# Patient Record
Sex: MALE | Race: WHITE | URBAN - METROPOLITAN AREA
[De-identification: names, ages, dates, MRNs, and addresses within clinical notes are randomized per-mention and may not be internally consistent; named-entity substitution may affect disease eponyms.]

---

## 2022-09-16 ENCOUNTER — OFFICE VISIT (OUTPATIENT)
Dept: FAMILY MEDICINE CLINIC | Age: 27
End: 2022-09-16
Payer: COMMERCIAL

## 2022-09-16 VITALS
RESPIRATION RATE: 16 BRPM | SYSTOLIC BLOOD PRESSURE: 118 MMHG | WEIGHT: 242.9 LBS | BODY MASS INDEX: 31.17 KG/M2 | DIASTOLIC BLOOD PRESSURE: 82 MMHG | TEMPERATURE: 97.9 F | OXYGEN SATURATION: 100 % | HEIGHT: 74 IN | HEART RATE: 69 BPM

## 2022-09-16 DIAGNOSIS — Z13.228 SCREENING FOR METABOLIC DISORDER: ICD-10-CM

## 2022-09-16 DIAGNOSIS — Z71.82 EXERCISE COUNSELING: ICD-10-CM

## 2022-09-16 DIAGNOSIS — Z71.85 VACCINE COUNSELING: ICD-10-CM

## 2022-09-16 DIAGNOSIS — K21.9 GASTROESOPHAGEAL REFLUX DISEASE, UNSPECIFIED WHETHER ESOPHAGITIS PRESENT: Primary | ICD-10-CM

## 2022-09-16 DIAGNOSIS — Z76.89 ESTABLISHING CARE WITH NEW DOCTOR, ENCOUNTER FOR: ICD-10-CM

## 2022-09-16 DIAGNOSIS — Z71.3 DIETARY COUNSELING: ICD-10-CM

## 2022-09-16 PROBLEM — H52.03 HYPEROPIA OF BOTH EYES: Status: ACTIVE | Noted: 2021-11-09

## 2022-09-16 PROCEDURE — 99203 OFFICE O/P NEW LOW 30 MIN: CPT | Performed by: SURGERY

## 2022-09-16 RX ORDER — PANTOPRAZOLE SODIUM 40 MG/1
40 TABLET, DELAYED RELEASE ORAL
Qty: 60 TABLET | Refills: 0 | Status: SHIPPED | OUTPATIENT
Start: 2022-09-16

## 2022-09-16 SDOH — ECONOMIC STABILITY: FOOD INSECURITY: WITHIN THE PAST 12 MONTHS, YOU WORRIED THAT YOUR FOOD WOULD RUN OUT BEFORE YOU GOT MONEY TO BUY MORE.: NEVER TRUE

## 2022-09-16 SDOH — ECONOMIC STABILITY: FOOD INSECURITY: WITHIN THE PAST 12 MONTHS, THE FOOD YOU BOUGHT JUST DIDN'T LAST AND YOU DIDN'T HAVE MONEY TO GET MORE.: NEVER TRUE

## 2022-09-16 ASSESSMENT — SOCIAL DETERMINANTS OF HEALTH (SDOH): HOW HARD IS IT FOR YOU TO PAY FOR THE VERY BASICS LIKE FOOD, HOUSING, MEDICAL CARE, AND HEATING?: NOT HARD AT ALL

## 2022-09-16 ASSESSMENT — LIFESTYLE VARIABLES
HOW OFTEN DO YOU HAVE A DRINK CONTAINING ALCOHOL: 2-4 TIMES A MONTH
HOW MANY STANDARD DRINKS CONTAINING ALCOHOL DO YOU HAVE ON A TYPICAL DAY: 10 OR MORE

## 2022-09-16 ASSESSMENT — PATIENT HEALTH QUESTIONNAIRE - PHQ9
SUM OF ALL RESPONSES TO PHQ9 QUESTIONS 1 & 2: 0
2. FEELING DOWN, DEPRESSED OR HOPELESS: 0
SUM OF ALL RESPONSES TO PHQ QUESTIONS 1-9: 0
1. LITTLE INTEREST OR PLEASURE IN DOING THINGS: 0

## 2022-09-16 NOTE — PATIENT INSTRUCTIONS
-Carbohydrates limit to 40 to 50g per meal (120g to 150g per day)  -Fats limit to 60g per day (total fat intake should be 30% to 35% of your total daily calories, so restrict to whichever number is lower)   -Of the fats you do eat, never eat trans fats, never eat unsaturated fats, limit your saturated fat intake to less than 20g per day (or 7% of your total daily calories, so restrict to whichever number is lower)  -Cholesterol limit to no more than 300mg per day (200mg per day if you have elevated triglycerides or total cholesterol)  -Sodium less than 2000mg per day    [employer] or similar application (or track by journal) to monitor calories and macronutrients. This is the most critical component to a heart healthy, balanced diet: honesty, keeping oneself accountable, ensure you are tracking daily goals and meeting them. Food is medicine! Counseled the patient on importance of cardiovascular and resistance training. Make every attempt to engage in a level of activity, sustained for at least 30 minutes, where you saturate your undergarments with sweat. This should be done, at a minimum, three times per week.      ___________________________________________________        Raise the head of your bed by 3.5\" (1/2 height cinder block from [employer]), no food or drink within 3 hours of laying flat, track diet/know triggers/avoid trigger.

## 2022-09-16 NOTE — PROGRESS NOTES
Michaela Herrera (:  1995) is a 32 y.o. male,New patient, here for evaluation of the following chief complaint(s):  Establish Care (Has not seen PCP in 3 years), Abdominal Pain (Pain behind rib cage), and Health Maintenance (Due for Covid Vaccine, HIV Screen, Hep C screen)         ASSESSMENT/PLAN:  1. Gastroesophageal reflux disease, unspecified whether esophagitis present  -     CBC with Auto Differential; Future  -     Comprehensive Metabolic Panel; Future  -     pantoprazole (PROTONIX) 40 MG tablet; Take 1 tablet by mouth every morning (before breakfast), Disp-60 tablet, R-0Normal  - PPI for 60 days if breakthrough during or after cessation then GI for EGD  - No alarm s/s  - Lifestyle modifications   2. Screening for metabolic disorder  -     CBC with Auto Differential; Future  -     Comprehensive Metabolic Panel; Future  3. Dietary counseling  Counseled the patient on diet, continue to make positive choices. It is important to focus on meeting food group needs with nutrient dense foods and stay within caloric limits. Nutrient dense foods will provide you with vitamins, minerals, and other health promoting nutrients. You should avoid added sugars, saturated fats, hydrogenated oils, and limit sodium intake (this isn't just table salt. .. turn the package over, read the label, stay under 2000 mg or 2g of total sodium daily). Track your calories, this will help you get an understanding of what a proper portion size is. Every day you should eat these:  -Veggies of all types  -Fruits  -Grains (strive for at least half of these to be whole-grain)  -Lean protein (poultry, fish, legumes, nuts)    Stick to the plate diet.    -85% of your dinner plate should be leafy green vegetables, dark green, red and orange vegetables. Do not use high-calorie dressing is a ranch or dressings that are filled with added sugars. 25% of your dinner plate should be whole grains.   The remaining 25% of your dinner plate should be a lean protein. Limit your red meat intake to once per week and no more than 4 ounces in any serving.  -No need for second helpings. Limit or avoid these foods:  -Added sugars (less than 10% of your total calories in any given day should be from sugars)  -Saturated fats and hydrogenated oils (less than 10% of your total calories in any given day should be from these)  -Sodium (less than 2000 mg/day)  -Alcoholic beverages (even in moderation, alcohol can cause long-term health issues involving hypertension, electrolyte abnormalities, liver disease and even cancers of the mouth and throat)    Stay hydrated. Unless instructed otherwise by your physician, you should strive to drink at least eight 8 ounce glasses of water daily, some of these being fortified with electrolytes (such as a Pedialyte, or low calorie sports drink). Again, avoid added sugars. 4. Exercise counseling  Counseled the patient on importance of cardiovascular and resistance training. Make every attempt to engage in a level of activity, sustained for at least 30 minutes, where you saturate your undergarments with sweat. This should be done, at a minimum, three times per week. 5. Vaccine counseling  Risks and benefits  6. Establishing care with new doctor, encounter for  All personal, family, social, surgical, medical history is reviewed and updated with patient. Allergies, medications updated. List of specialists follows with updated. DM/HM updated. Return in about 2 months (around 11/15/2022) for gerd .          Subjective   SUBJECTIVE/OBJECTIVE:  HPI:    Loss to follow up   Served in [employer] ([employer])       Bilateral Rib Cage pain:  -first noticed about 6 weeks prior  -shipping  as career (forklift, no heavy lifting)  -came out of nowhere  -pain described as an ache, 2 to 4/10  -was intermittent at first then become continuous for about a week  -made worse postprandially  -sensation of bubble in epigastrium along with burning sensation from sternum up to posterior oropharynx    -no hemoptysis, hematemesis, hematochezia, melena      Psych:  PHQ-9 Total Score: 0 (9/16/2022  8:35 AM)      Preventative:  Health Maintenance   Topic Date Due    Depression Screen  Never done    Flu vaccine (1) 09/02/2024 (Originally 9/1/2022)    COVID-19 Vaccine (1) 09/01/2028 (Originally 1995)    DTaP/Tdap/Td vaccine (3 - Td or Tdap) 09/13/2031    Varicella vaccine  Completed    Hepatitis A vaccine  Aged Out    Hepatitis B vaccine  Aged Out    Hib vaccine  Aged Out    Meningococcal (ACWY) vaccine  Aged Out    Pneumococcal 0-64 years Vaccine  Aged Out    Hepatitis C screen  Discontinued    HIV screen  Discontinued     Psych:  PHQ-9 Total Score: 0 (9/16/2022  8:35 AM)      ROS:    Denies 10pt ROS other than noted in HPI. Objective       PHYSICAL EXAM:    /82   Pulse 69   Temp 97.9 °F (36.6 °C) (Temporal)   Resp 16   Ht 6' 2\" (1.88 m)   Wt 242 lb 14.4 oz (110.2 kg)   SpO2 100%   BMI 31.19 kg/m²     AVSS    GA: Well-groomed, appears well, no acute distress. HEENT: Atraumatic normocephalic. Extraocular muscles are grossly intact. Pupils are equal round reactive to light. Conjunctiva pink and moist.  Hearing is grossly intact. Nares patent without external drainage. Buccal mucosa pink and moist.  Posterior oropharynx clear without lesion or exudate. NECK: Trachea is midline, supple, nontender, no lymphadenopathy. CARDIO: Regular rate and rhythm without murmur rub or gallop. Cap refill 2+. Radial pulses 2+ bilaterally. RESPIRATORY: Clear to auscultation bilaterally without wheezes rales or rhonchi. Normal inspiratory and expiratory effort. Normoxic on room air    ABD: Rounded, normoactive bowel sounds. Soft, nontender, no organomegaly. MSK: Structurally appropriate for age. No gross deficit. NEURO: Alert, no gross deficit. PSYCH:  Mood is normal and congruent with affect.  No signs of psychomotor retardation or agitation. Thought content seems normal, speech is fluent and non-pressured. SKIN: Generally warm pink and dry. An electronic signature was used to authenticate this note.     --Lisa Villeda, DO

## 2022-12-02 ENCOUNTER — OFFICE VISIT (OUTPATIENT)
Dept: FAMILY MEDICINE CLINIC | Age: 27
End: 2022-12-02

## 2022-12-02 VITALS
SYSTOLIC BLOOD PRESSURE: 120 MMHG | RESPIRATION RATE: 16 BRPM | TEMPERATURE: 97.5 F | DIASTOLIC BLOOD PRESSURE: 74 MMHG | HEIGHT: 74 IN | HEART RATE: 57 BPM | OXYGEN SATURATION: 99 % | WEIGHT: 232.8 LBS | BODY MASS INDEX: 29.88 KG/M2

## 2022-12-02 DIAGNOSIS — K21.9 GASTROESOPHAGEAL REFLUX DISEASE, UNSPECIFIED WHETHER ESOPHAGITIS PRESENT: ICD-10-CM

## 2022-12-02 DIAGNOSIS — Z71.3 DIETARY COUNSELING: Primary | ICD-10-CM

## 2022-12-02 RX ORDER — PANTOPRAZOLE SODIUM 40 MG/1
40 TABLET, DELAYED RELEASE ORAL
Qty: 90 TABLET | Refills: 3 | Status: CANCELLED | OUTPATIENT
Start: 2022-12-02

## 2022-12-02 NOTE — PATIENT INSTRUCTIONS
Eat between 1400 and 1800 Calories per day to loose weight  -Carbohydrates limit to 40 to 50g per meal (120g to 150g per day)  -Fats limit to 60g per day (total fat intake should be 30% to 35% of your total daily calories, so restrict to whichever number is lower)   -Of the fats you do eat, never eat trans fats, never eat unsaturated fats, limit your saturated fat intake to less than 20g per day (or 7% of your total daily calories, so restrict to whichever number is lower)  -Cholesterol limit to no more than 300mg per day (200mg per day if you have elevated triglycerides or total cholesterol)  -Sodium less than 2000mg per day    [employer] or similar application (or track by journal) to monitor calories and macronutrients. This is the most critical component to a heart healthy, balanced diet: honesty, keeping oneself accountable, ensure you are tracking daily goals and meeting them. Food is medicine! Counseled the patient on importance of cardiovascular and resistance training. Make every attempt to engage in a level of activity, sustained for at least 30 minutes, where you saturate your undergarments with sweat. This should be done, at a minimum, three times per week. Target HR 155bpm sustained for 15 min straight 5 times per week.          GERD  -No food within 3 hours of laying to bed (same goes for medications with the exception of sleep aids that are 1 hour prior to bed)  -No drink within 1 hour of laying to bed  -Elevate the head of the bed by 30 degrees (1/2 heigh cinder blocks work well)   -Keep food diary and know/avoid triggers

## 2022-12-02 NOTE — PROGRESS NOTES
Blanche Knott (:  1995) is a 32 y.o. male,Established patient, here for evaluation of the following chief complaint(s):  Follow-up, Gastroesophageal Reflux, and Health Maintenance         ASSESSMENT/PLAN:  1. Dietary counseling  Excellent job with wt loss  Reinforced dietary concepts noted in avs  Improved  No change  2. Gastroesophageal reflux disease, unspecified whether esophagitis present  Resolved  Reinforced lifestyle changes      Return in about 10 months (around 2023) for annual adult exam .         Subjective   SUBJECTIVE/OBJECTIVE:  HPI:      GERD:  cut out dietary triggers  restricting within 3 hours of bed  Did not identify specific triggers    Reflux is almost non-existent  No longer taking medication  Symptoms maybe once in a week and not like it was before, more of \"a hunger pain in my throat but not acidic at all\"    Did not get labs done but this is okay since everything resolved      Diet/Wt Loss:  -lost 10#  -less etoh, less total calories per day        Preventative:  Health Maintenance   Topic Date Due    Flu vaccine (1) 2024 (Originally 2022)    COVID-19 Vaccine (1) 2028 (Originally 1995)    Depression Screen  2023    DTaP/Tdap/Td vaccine (3 - Td or Tdap) 2031    Varicella vaccine  Completed    Orthopox (Smallpox/Monkeypox) Vaccine  Completed    Hepatitis A vaccine  Aged Out    Hib vaccine  Aged Out    Meningococcal (ACWY) vaccine  Aged Out    Pneumococcal 0-64 years Vaccine  Aged Out    Hepatitis C screen  Discontinued    HIV screen  Discontinued           ROS:    Denies 10pt ROS other than noted in HPI. Objective       PHYSICAL EXAM:    /74   Pulse 57   Temp 97.5 °F (36.4 °C) (Temporal)   Resp 16   Ht 6' 2\" (1.88 m)   Wt 232 lb 12.8 oz (105.6 kg)   SpO2 99%   BMI 29.89 kg/m²     AVSS    GA: Well-groomed, appears well, no acute distress. HEENT: Atraumatic normocephalic. Extraocular muscles are grossly intact.   Pupils are equal round reactive to light. Conjunctiva pink and moist.  Hearing is grossly intact. Nares patent without external drainage. Buccal mucosa pink and moist.  Posterior oropharynx clear without lesion or exudate. NECK: Trachea is midline    CARDIO: Regular rate and rhythm without murmur rub or gallop. RESPIRATORY: Clear to auscultation bilaterally without wheezes rales or rhonchi. Normal inspiratory and expiratory effort. Normoxic on room air    ABD: Rounded, normoactive bowel sounds. No epigastric tenderness. MSK: Structurally appropriate for age. No gross deficit. NEURO: Alert, no gross deficit. PSYCH:  Mood is normal and congruent with affect. No signs of psychomotor retardation or agitation. Thought content seems normal, speech is fluent and non-pressured. SKIN: Generally warm pink and dry. An electronic signature was used to authenticate this note.     --Kika Sandhu, DO